# Patient Record
Sex: MALE | Race: WHITE | NOT HISPANIC OR LATINO | Employment: FULL TIME | URBAN - METROPOLITAN AREA
[De-identification: names, ages, dates, MRNs, and addresses within clinical notes are randomized per-mention and may not be internally consistent; named-entity substitution may affect disease eponyms.]

---

## 2018-08-16 ENCOUNTER — APPOINTMENT (OUTPATIENT)
Dept: URGENT CARE | Age: 23
End: 2018-08-16
Payer: OTHER MISCELLANEOUS

## 2018-08-16 PROCEDURE — 90715 TDAP VACCINE 7 YRS/> IM: CPT

## 2018-08-16 PROCEDURE — 90471 IMMUNIZATION ADMIN: CPT | Performed by: PREVENTIVE MEDICINE

## 2018-08-16 PROCEDURE — G0382 LEV 3 HOSP TYPE B ED VISIT: HCPCS

## 2018-08-16 PROCEDURE — 99283 EMERGENCY DEPT VISIT LOW MDM: CPT

## 2022-03-16 ENCOUNTER — HOSPITAL ENCOUNTER (OUTPATIENT)
Facility: HOSPITAL | Age: 27
Setting detail: OBSERVATION
Discharge: HOME | End: 2022-03-17
Attending: EMERGENCY MEDICINE | Admitting: SURGERY
Payer: COMMERCIAL

## 2022-03-16 ENCOUNTER — APPOINTMENT (EMERGENCY)
Dept: RADIOLOGY | Facility: HOSPITAL | Age: 27
End: 2022-03-16
Payer: COMMERCIAL

## 2022-03-16 ENCOUNTER — TELEPHONE (OUTPATIENT)
Dept: CT IMAGING | Facility: HOSPITAL | Age: 27
End: 2022-03-16

## 2022-03-16 ENCOUNTER — APPOINTMENT (EMERGENCY)
Dept: CT IMAGING | Facility: HOSPITAL | Age: 27
End: 2022-03-16
Payer: COMMERCIAL

## 2022-03-16 DIAGNOSIS — S09.90XA INJURY OF HEAD, INITIAL ENCOUNTER: ICD-10-CM

## 2022-03-16 DIAGNOSIS — J98.2 PNEUMOMEDIASTINUM (HCC): Primary | ICD-10-CM
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
## 2022-03-16 DIAGNOSIS — S06.0X0A CONCUSSION WITHOUT LOSS OF CONSCIOUSNESS, INITIAL ENCOUNTER: ICD-10-CM

## 2022-03-16 LAB
ALBUMIN SERPL BCP-MCNC: 4.2 G/DL (ref 3.5–5)
ALP SERPL-CCNC: 72 U/L (ref 46–116)
ALT SERPL W P-5'-P-CCNC: 33 U/L (ref 12–78)
ANION GAP SERPL CALCULATED.3IONS-SCNC: 9 MMOL/L (ref 4–13)
APTT PPP: 28 SECONDS (ref 23–37)
AST SERPL W P-5'-P-CCNC: 37 U/L (ref 5–45)
BASOPHILS # BLD AUTO: 0.04 THOUSANDS/ΜL (ref 0–0.1)
BASOPHILS NFR BLD AUTO: 0 % (ref 0–1)
BILIRUB SERPL-MCNC: 0.42 MG/DL (ref 0.2–1)
BUN SERPL-MCNC: 23 MG/DL (ref 5–25)
CALCIUM SERPL-MCNC: 9.5 MG/DL (ref 8.3–10.1)
CHLORIDE SERPL-SCNC: 103 MMOL/L (ref 100–108)
CO2 SERPL-SCNC: 27 MMOL/L (ref 21–32)
CREAT SERPL-MCNC: 1 MG/DL (ref 0.6–1.3)
EOSINOPHIL # BLD AUTO: 0.1 THOUSAND/ΜL (ref 0–0.61)
EOSINOPHIL NFR BLD AUTO: 1 % (ref 0–6)
ERYTHROCYTE [DISTWIDTH] IN BLOOD BY AUTOMATED COUNT: 13.4 % (ref 11.6–15.1)
GFR SERPL CREATININE-BSD FRML MDRD: 101 ML/MIN/1.73SQ M
GLUCOSE SERPL-MCNC: 90 MG/DL (ref 65–140)
HCT VFR BLD AUTO: 47.1 % (ref 36.5–49.3)
HGB BLD-MCNC: 15.3 G/DL (ref 12–17)
IMM GRANULOCYTES # BLD AUTO: 0.04 THOUSAND/UL (ref 0–0.2)
IMM GRANULOCYTES NFR BLD AUTO: 0 % (ref 0–2)
INR PPP: 0.93 (ref 0.84–1.19)
LYMPHOCYTES # BLD AUTO: 3.07 THOUSANDS/ΜL (ref 0.6–4.47)
LYMPHOCYTES NFR BLD AUTO: 27 % (ref 14–44)
MCH RBC QN AUTO: 30.1 PG (ref 26.8–34.3)
MCHC RBC AUTO-ENTMCNC: 32.5 G/DL (ref 31.4–37.4)
MCV RBC AUTO: 93 FL (ref 82–98)
MONOCYTES # BLD AUTO: 0.89 THOUSAND/ΜL (ref 0.17–1.22)
MONOCYTES NFR BLD AUTO: 8 % (ref 4–12)
NEUTROPHILS # BLD AUTO: 7.05 THOUSANDS/ΜL (ref 1.85–7.62)
NEUTS SEG NFR BLD AUTO: 64 % (ref 43–75)
NRBC BLD AUTO-RTO: 0 /100 WBCS
PLATELET # BLD AUTO: 190 THOUSANDS/UL (ref 149–390)
PMV BLD AUTO: 12 FL (ref 8.9–12.7)
POTASSIUM SERPL-SCNC: 4.2 MMOL/L (ref 3.5–5.3)
PROT SERPL-MCNC: 7.9 G/DL (ref 6.4–8.2)
PROTHROMBIN TIME: 12.5 SECONDS (ref 11.6–14.5)
RBC # BLD AUTO: 5.09 MILLION/UL (ref 3.88–5.62)
SODIUM SERPL-SCNC: 139 MMOL/L (ref 136–145)
WBC # BLD AUTO: 11.19 THOUSAND/UL (ref 4.31–10.16)

## 2022-03-16 PROCEDURE — 84484 ASSAY OF TROPONIN QUANT: CPT | Performed by: PHYSICIAN ASSISTANT

## 2022-03-16 PROCEDURE — 80053 COMPREHEN METABOLIC PANEL: CPT | Performed by: PHYSICIAN ASSISTANT

## 2022-03-16 PROCEDURE — 82553 CREATINE MB FRACTION: CPT | Performed by: PHYSICIAN ASSISTANT

## 2022-03-16 PROCEDURE — 85730 THROMBOPLASTIN TIME PARTIAL: CPT | Performed by: PHYSICIAN ASSISTANT

## 2022-03-16 PROCEDURE — 73030 X-RAY EXAM OF SHOULDER: CPT

## 2022-03-16 PROCEDURE — 85610 PROTHROMBIN TIME: CPT | Performed by: PHYSICIAN ASSISTANT

## 2022-03-16 PROCEDURE — 71045 X-RAY EXAM CHEST 1 VIEW: CPT

## 2022-03-16 PROCEDURE — 99285 EMERGENCY DEPT VISIT HI MDM: CPT

## 2022-03-16 PROCEDURE — 96361 HYDRATE IV INFUSION ADD-ON: CPT

## 2022-03-16 PROCEDURE — 99285 EMERGENCY DEPT VISIT HI MDM: CPT | Performed by: PHYSICIAN ASSISTANT

## 2022-03-16 PROCEDURE — 85025 COMPLETE CBC W/AUTO DIFF WBC: CPT | Performed by: PHYSICIAN ASSISTANT

## 2022-03-16 PROCEDURE — 82550 ASSAY OF CK (CPK): CPT | Performed by: PHYSICIAN ASSISTANT

## 2022-03-16 PROCEDURE — 36415 COLL VENOUS BLD VENIPUNCTURE: CPT | Performed by: PHYSICIAN ASSISTANT

## 2022-03-16 RX ORDER — MORPHINE SULFATE 4 MG/ML
4 INJECTION, SOLUTION INTRAMUSCULAR; INTRAVENOUS ONCE
Status: COMPLETED | OUTPATIENT
Start: 2022-03-16 | End: 2022-03-17

## 2022-03-16 RX ADMIN — SODIUM CHLORIDE 1000 ML: 0.9 INJECTION, SOLUTION INTRAVENOUS at 23:39

## 2022-03-16 NOTE — LETTER
[facility] [address] [id number]  Dept: [phone]    March 17, 2022     Patient: Henry Snyder   YOB: 1995   Date of Visit: 3/16/2022       To Whom it May Concern:    Сергей Beaulieu is under my professional care  He was seen in the hospital from 3/16/2022   to 03/17/22  He may return to work with the following limitations until re-evaluated on March 29, 2022: no heavy lifting over 20 lbs, no pushing or pulling over 50 lbs, no jumping, running, or contact sports  If you have any questions or concerns, please don't hesitate to call           Sincerely,          Olu Jiménez PA-C

## 2022-03-17 ENCOUNTER — APPOINTMENT (OUTPATIENT)
Dept: RADIOLOGY | Facility: HOSPITAL | Age: 27
End: 2022-03-17
Payer: COMMERCIAL

## 2022-03-17 ENCOUNTER — APPOINTMENT (EMERGENCY)
Dept: CT IMAGING | Facility: HOSPITAL | Age: 27
End: 2022-03-17
Payer: COMMERCIAL

## 2022-03-17 VITALS
SYSTOLIC BLOOD PRESSURE: 107 MMHG | TEMPERATURE: 98.8 F | HEART RATE: 50 BPM | RESPIRATION RATE: 16 BRPM | OXYGEN SATURATION: 99 % | DIASTOLIC BLOOD PRESSURE: 58 MMHG

## 2022-03-17 PROBLEM — S06.0XAA CONCUSSION: Status: ACTIVE | Noted: 2022-03-17

## 2022-03-17 PROBLEM — J98.2 PNEUMOMEDIASTINUM (HCC): Status: ACTIVE | Noted: 2022-03-17
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
## 2022-03-17 PROBLEM — S06.0X9A CONCUSSION: Status: ACTIVE | Noted: 2022-03-17

## 2022-03-17 LAB
ABO GROUP BLD: NORMAL
ABO GROUP BLD: NORMAL
ALBUMIN SERPL BCP-MCNC: 3.4 G/DL (ref 3.5–5)
ALP SERPL-CCNC: 59 U/L (ref 46–116)
ALT SERPL W P-5'-P-CCNC: 26 U/L (ref 12–78)
ANION GAP SERPL CALCULATED.3IONS-SCNC: 9 MMOL/L (ref 4–13)
AST SERPL W P-5'-P-CCNC: 28 U/L (ref 5–45)
BASOPHILS # BLD AUTO: 0.02 THOUSANDS/ΜL (ref 0–0.1)
BASOPHILS NFR BLD AUTO: 0 % (ref 0–1)
BILIRUB SERPL-MCNC: 0.6 MG/DL (ref 0.2–1)
BLD GP AB SCN SERPL QL: NEGATIVE
BUN SERPL-MCNC: 18 MG/DL (ref 5–25)
CALCIUM ALBUM COR SERPL-MCNC: 9.5 MG/DL (ref 8.3–10.1)
CALCIUM SERPL-MCNC: 9 MG/DL (ref 8.3–10.1)
CARDIAC TROPONIN I PNL SERPL HS: <2 NG/L
CHLORIDE SERPL-SCNC: 106 MMOL/L (ref 100–108)
CK MB SERPL-MCNC: 1.6 NG/ML (ref 0–5)
CK MB SERPL-MCNC: <1 % (ref 0–2.5)
CK SERPL-CCNC: 375 U/L (ref 39–308)
CO2 SERPL-SCNC: 23 MMOL/L (ref 21–32)
CREAT SERPL-MCNC: 0.9 MG/DL (ref 0.6–1.3)
EOSINOPHIL # BLD AUTO: 0.05 THOUSAND/ΜL (ref 0–0.61)
EOSINOPHIL NFR BLD AUTO: 1 % (ref 0–6)
ERYTHROCYTE [DISTWIDTH] IN BLOOD BY AUTOMATED COUNT: 13.5 % (ref 11.6–15.1)
GFR SERPL CREATININE-BSD FRML MDRD: 115 ML/MIN/1.73SQ M
GLUCOSE SERPL-MCNC: 98 MG/DL (ref 65–140)
HCT VFR BLD AUTO: 39.5 % (ref 36.5–49.3)
HGB BLD-MCNC: 13.5 G/DL (ref 12–17)
IMM GRANULOCYTES # BLD AUTO: 0.03 THOUSAND/UL (ref 0–0.2)
IMM GRANULOCYTES NFR BLD AUTO: 0 % (ref 0–2)
LYMPHOCYTES # BLD AUTO: 2.51 THOUSANDS/ΜL (ref 0.6–4.47)
LYMPHOCYTES NFR BLD AUTO: 27 % (ref 14–44)
MCH RBC QN AUTO: 30.8 PG (ref 26.8–34.3)
MCHC RBC AUTO-ENTMCNC: 34.2 G/DL (ref 31.4–37.4)
MCV RBC AUTO: 90 FL (ref 82–98)
MONOCYTES # BLD AUTO: 0.94 THOUSAND/ΜL (ref 0.17–1.22)
MONOCYTES NFR BLD AUTO: 10 % (ref 4–12)
NEUTROPHILS # BLD AUTO: 5.84 THOUSANDS/ΜL (ref 1.85–7.62)
NEUTS SEG NFR BLD AUTO: 62 % (ref 43–75)
NRBC BLD AUTO-RTO: 0 /100 WBCS
PLATELET # BLD AUTO: 155 THOUSANDS/UL (ref 149–390)
PMV BLD AUTO: 11.9 FL (ref 8.9–12.7)
POTASSIUM SERPL-SCNC: 4 MMOL/L (ref 3.5–5.3)
PROT SERPL-MCNC: 6.4 G/DL (ref 6.4–8.2)
RBC # BLD AUTO: 4.39 MILLION/UL (ref 3.88–5.62)
RH BLD: POSITIVE
RH BLD: POSITIVE
SODIUM SERPL-SCNC: 138 MMOL/L (ref 136–145)
SPECIMEN EXPIRATION DATE: NORMAL
WBC # BLD AUTO: 9.39 THOUSAND/UL (ref 4.31–10.16)

## 2022-03-17 PROCEDURE — NC001 PR NO CHARGE: Performed by: SURGERY

## 2022-03-17 PROCEDURE — 96361 HYDRATE IV INFUSION ADD-ON: CPT

## 2022-03-17 PROCEDURE — 72125 CT NECK SPINE W/O DYE: CPT

## 2022-03-17 PROCEDURE — 84484 ASSAY OF TROPONIN QUANT: CPT | Performed by: PHYSICIAN ASSISTANT

## 2022-03-17 PROCEDURE — 36415 COLL VENOUS BLD VENIPUNCTURE: CPT | Performed by: PHYSICIAN ASSISTANT

## 2022-03-17 PROCEDURE — 70450 CT HEAD/BRAIN W/O DYE: CPT

## 2022-03-17 PROCEDURE — 74177 CT ABD & PELVIS W/CONTRAST: CPT

## 2022-03-17 PROCEDURE — 86900 BLOOD TYPING SEROLOGIC ABO: CPT | Performed by: PHYSICIAN ASSISTANT

## 2022-03-17 PROCEDURE — 80053 COMPREHEN METABOLIC PANEL: CPT | Performed by: PHYSICIAN ASSISTANT

## 2022-03-17 PROCEDURE — 99217 PR OBSERVATION CARE DISCHARGE MANAGEMENT: CPT | Performed by: SURGERY

## 2022-03-17 PROCEDURE — 74220 X-RAY XM ESOPHAGUS 1CNTRST: CPT

## 2022-03-17 PROCEDURE — 85025 COMPLETE CBC W/AUTO DIFF WBC: CPT | Performed by: PHYSICIAN ASSISTANT

## 2022-03-17 PROCEDURE — 93005 ELECTROCARDIOGRAM TRACING: CPT

## 2022-03-17 PROCEDURE — 86850 RBC ANTIBODY SCREEN: CPT | Performed by: PHYSICIAN ASSISTANT

## 2022-03-17 PROCEDURE — 99235 HOSP IP/OBS SAME DATE MOD 70: CPT | Performed by: SURGERY

## 2022-03-17 PROCEDURE — 96374 THER/PROPH/DIAG INJ IV PUSH: CPT

## 2022-03-17 PROCEDURE — 71260 CT THORAX DX C+: CPT

## 2022-03-17 PROCEDURE — 86901 BLOOD TYPING SEROLOGIC RH(D): CPT | Performed by: PHYSICIAN ASSISTANT

## 2022-03-17 PROCEDURE — G1004 CDSM NDSC: HCPCS

## 2022-03-17 RX ORDER — ACETAMINOPHEN 325 MG/1
650 TABLET ORAL EVERY 8 HOURS SCHEDULED
Refills: 0
Start: 2022-03-17

## 2022-03-17 RX ORDER — IBUPROFEN 400 MG/1
400 TABLET ORAL EVERY 6 HOURS PRN
Refills: 0
Start: 2022-03-17

## 2022-03-17 RX ORDER — SODIUM CHLORIDE, SODIUM GLUCONATE, SODIUM ACETATE, POTASSIUM CHLORIDE, MAGNESIUM CHLORIDE, SODIUM PHOSPHATE, DIBASIC, AND POTASSIUM PHOSPHATE .53; .5; .37; .037; .03; .012; .00082 G/100ML; G/100ML; G/100ML; G/100ML; G/100ML; G/100ML; G/100ML
125 INJECTION, SOLUTION INTRAVENOUS CONTINUOUS
Status: DISCONTINUED | OUTPATIENT
Start: 2022-03-17 | End: 2022-03-17

## 2022-03-17 RX ORDER — KETOROLAC TROMETHAMINE 30 MG/ML
15 INJECTION, SOLUTION INTRAMUSCULAR; INTRAVENOUS EVERY 6 HOURS SCHEDULED
Status: DISCONTINUED | OUTPATIENT
Start: 2022-03-17 | End: 2022-03-17

## 2022-03-17 RX ORDER — ACETAMINOPHEN 325 MG/1
975 TABLET ORAL EVERY 8 HOURS SCHEDULED
Status: DISCONTINUED | OUTPATIENT
Start: 2022-03-17 | End: 2022-03-17 | Stop reason: HOSPADM

## 2022-03-17 RX ORDER — ONDANSETRON 2 MG/ML
4 INJECTION INTRAMUSCULAR; INTRAVENOUS EVERY 4 HOURS PRN
Status: DISCONTINUED | OUTPATIENT
Start: 2022-03-17 | End: 2022-03-17 | Stop reason: HOSPADM

## 2022-03-17 RX ORDER — OXYCODONE HYDROCHLORIDE 10 MG/1
10 TABLET ORAL EVERY 4 HOURS PRN
Status: DISCONTINUED | OUTPATIENT
Start: 2022-03-17 | End: 2022-03-17 | Stop reason: HOSPADM

## 2022-03-17 RX ORDER — OXYCODONE HYDROCHLORIDE 5 MG/1
5 TABLET ORAL EVERY 4 HOURS PRN
Status: DISCONTINUED | OUTPATIENT
Start: 2022-03-17 | End: 2022-03-17 | Stop reason: HOSPADM

## 2022-03-17 RX ORDER — DOCUSATE SODIUM 100 MG/1
100 CAPSULE, LIQUID FILLED ORAL 2 TIMES DAILY
Status: DISCONTINUED | OUTPATIENT
Start: 2022-03-17 | End: 2022-03-17 | Stop reason: HOSPADM

## 2022-03-17 RX ORDER — METHOCARBAMOL 500 MG/1
500 TABLET, FILM COATED ORAL EVERY 6 HOURS SCHEDULED
Status: DISCONTINUED | OUTPATIENT
Start: 2022-03-17 | End: 2022-03-17 | Stop reason: HOSPADM

## 2022-03-17 RX ORDER — HYDROMORPHONE HCL/PF 1 MG/ML
0.5 SYRINGE (ML) INJECTION
Status: DISCONTINUED | OUTPATIENT
Start: 2022-03-17 | End: 2022-03-17

## 2022-03-17 RX ADMIN — KETOROLAC TROMETHAMINE 15 MG: 30 INJECTION, SOLUTION INTRAMUSCULAR at 03:23

## 2022-03-17 RX ADMIN — IOHEXOL 100 ML: 350 INJECTION, SOLUTION INTRAVENOUS at 00:10

## 2022-03-17 RX ADMIN — KETOROLAC TROMETHAMINE 15 MG: 30 INJECTION, SOLUTION INTRAMUSCULAR at 08:22

## 2022-03-17 RX ADMIN — METHOCARBAMOL 500 MG: 500 TABLET ORAL at 08:23

## 2022-03-17 RX ADMIN — ENOXAPARIN SODIUM 30 MG: 30 INJECTION SUBCUTANEOUS at 03:23

## 2022-03-17 RX ADMIN — DOCUSATE SODIUM 100 MG: 100 CAPSULE ORAL at 08:23

## 2022-03-17 RX ADMIN — IOHEXOL 200 ML: 350 INJECTION, SOLUTION INTRAVENOUS at 10:45

## 2022-03-17 RX ADMIN — OXYCODONE HYDROCHLORIDE 5 MG: 5 TABLET ORAL at 03:17

## 2022-03-17 RX ADMIN — MORPHINE SULFATE 4 MG: 4 INJECTION INTRAVENOUS at 00:52

## 2022-03-17 RX ADMIN — METHOCARBAMOL 500 MG: 500 TABLET ORAL at 03:24

## 2022-03-17 RX ADMIN — SODIUM CHLORIDE, SODIUM GLUCONATE, SODIUM ACETATE, POTASSIUM CHLORIDE, MAGNESIUM CHLORIDE, SODIUM PHOSPHATE, DIBASIC, AND POTASSIUM PHOSPHATE 125 ML/HR: .53; .5; .37; .037; .03; .012; .00082 INJECTION, SOLUTION INTRAVENOUS at 03:15

## 2022-03-17 NOTE — ASSESSMENT & PLAN NOTE
- Traumatic pneumomediastum secondary to fall onto chest  - Associated odynophagia and chest pain when talking  - Admit to med/surg overnight for observation   - Monitor for tachycardia/fevers  - Barium swallow eval pending  - Multimodal pain regimen   - IV fluids while NPO

## 2022-03-17 NOTE — ASSESSMENT & PLAN NOTE
- Fall while skiing landing on chest with subsequent chest pain  - Below noted injuries  - Multimodal pain regimen

## 2022-03-17 NOTE — ASSESSMENT & PLAN NOTE
- patient was snowboarding when he went off a jump and landed on his head, he was wearing a helmet, no loss of consciousness  - CT head negative  - CT C-spine negative  - patient continues to have a headache, no dizziness or photophobia    - Tylenol  - follow-up in trauma clinic in 2 weeks if symptoms continue

## 2022-03-17 NOTE — ED PROVIDER NOTES
History  Chief Complaint   Patient presents with    Rib Pain     Pt was snowboarding around 6 pm, went off of a jump and hit his head, +helmet, +LOC, no thinners  Reports R rib pain and pain with breathing     Patient is a 70-year-old male presenting to the emergency room for evaluation of right rib pain and right upper quadrant pain  Patient states around 6:30 p m  This evening he was snowboarding, he states he went off a job that was about 8-10 feet high  He states he did not land his jump and he fell onto his left side  Patient states he did strike his head and lose consciousness for an unknown period of time  He did have a helmet on at this time  He states he feels fine other than having some right rib and right upper quadrant pain  Patient was able to ambulate on the scene  He complains of no other injuries  History provided by:  Patient   used: No        None       History reviewed  No pertinent past medical history  History reviewed  No pertinent surgical history  History reviewed  No pertinent family history  I have reviewed and agree with the history as documented  E-Cigarette/Vaping     E-Cigarette/Vaping Substances     Social History     Tobacco Use    Smoking status: Never Smoker    Smokeless tobacco: Never Used   Substance Use Topics    Alcohol use: Never    Drug use: Never       Review of Systems   Constitutional: Negative for chills and fever  HENT: Negative for ear pain and sore throat  Head injury   LOC   Eyes: Negative for pain and visual disturbance  Respiratory: Negative for cough and shortness of breath  Cardiovascular: Negative for chest pain and palpitations  Gastrointestinal: Negative for abdominal pain and vomiting  Genitourinary: Negative for dysuria and hematuria  Musculoskeletal: Positive for arthralgias (right rib pain)  Negative for back pain  Skin: Negative for color change and rash     Neurological: Negative for seizures and syncope  All other systems reviewed and are negative  Physical Exam  Physical Exam  Vitals and nursing note reviewed  Constitutional:       Appearance: He is well-developed  HENT:      Head: Normocephalic and atraumatic  Comments: No signs basilar skull fracture  Eyes:      Conjunctiva/sclera: Conjunctivae normal    Neck:      Comments: No cervical spine tenderness  Cardiovascular:      Rate and Rhythm: Normal rate and regular rhythm  Heart sounds: No murmur heard  Pulmonary:      Effort: Pulmonary effort is normal  No respiratory distress  Breath sounds: Normal breath sounds  Abdominal:      Palpations: Abdomen is soft  Tenderness: There is abdominal tenderness  Comments: RUQ tenderness  No abdominal / flank ecchymosis   Musculoskeletal:         General: Tenderness present  Cervical back: Normal range of motion and neck supple  No tenderness  Comments: Tenderness to left shoulder  Tenderness to right anterior ribs  No thoracic spine or lumbar spine tenderness   Skin:     General: Skin is warm and dry  Neurological:      Mental Status: He is alert           Vital Signs  ED Triage Vitals   Temperature Pulse Respirations Blood Pressure SpO2   03/16/22 2022 03/16/22 2022 03/16/22 2022 03/16/22 2022 03/16/22 2022   99 2 °F (37 3 °C) 86 18 132/65 100 %      Temp Source Heart Rate Source Patient Position - Orthostatic VS BP Location FiO2 (%)   03/16/22 2022 03/16/22 2022 03/16/22 2022 03/16/22 2022 --   Oral Monitor Sitting Left arm       Pain Score       03/17/22 0051       6           Vitals:    03/16/22 2022 03/17/22 0051 03/17/22 0225   BP: 132/65 158/70 116/61   Pulse: 86 82 78   Patient Position - Orthostatic VS: Sitting Lying Lying         ED Medications  Medications   multi-electrolyte (PLASMALYTE-A/ISOLYTE-S PH 7 4) IV solution (has no administration in time range)   methocarbamol (ROBAXIN) tablet 500 mg (has no administration in time range) docusate sodium (COLACE) capsule 100 mg (has no administration in time range)   ondansetron (ZOFRAN) injection 4 mg (has no administration in time range)   enoxaparin (LOVENOX) subcutaneous injection 30 mg (has no administration in time range)   acetaminophen (TYLENOL) tablet 975 mg (has no administration in time range)   ketorolac (TORADOL) injection 15 mg (has no administration in time range)   oxyCODONE (ROXICODONE) IR tablet 5 mg (has no administration in time range)   HYDROmorphone (DILAUDID) injection 0 5 mg (has no administration in time range)   oxyCODONE (ROXICODONE) immediate release tablet 10 mg (has no administration in time range)   morphine (PF) 4 mg/mL injection 4 mg (4 mg Intravenous Given 3/17/22 0052)   sodium chloride 0 9 % bolus 1,000 mL (0 mL Intravenous Stopped 3/17/22 0143)   iohexol (OMNIPAQUE) 350 MG/ML injection (SINGLE-DOSE) 100 mL (100 mL Intravenous Given 3/17/22 0010)       Diagnostic Studies  Results Reviewed     Procedure Component Value Units Date/Time    HS Troponin I 2hr [[id number]] Collected: 03/17/22 0148    Lab Status: Final result Specimen: Blood from Arm, Right Updated: 03/17/22 0233     hs TnI 2hr <2 ng/L      Delta 2hr hsTnI --    Platelet count [[id number]]     Lab Status: No result Specimen: Blood     CKMB [[id number]]  (Normal) Collected: 03/16/22 2334    Lab Status: Final result Specimen: Blood from Arm, Right Updated: 03/17/22 0029     CK-MB Index <1 0 %      CK-MB 1 6 ng/mL     CK [[id number]]  (Abnormal) Collected: 03/16/22 2334    Lab Status: Final result Specimen: Blood from Arm, Right Updated: 03/17/22 0021     Total  U/L     HS Troponin I 4hr [[id number]]     Lab Status: No result Specimen: Blood     HS Troponin 0hr (reflex protocol) [[id number]]  (Normal) Collected: 03/16/22 2334    Lab Status: Final result Specimen: Blood from Arm, Right Updated: 03/17/22 0010     hs TnI 0hr <2 ng/L     Comprehensive metabolic panel [[id number]] Collected: 03/16/22 8429    Lab Status: Final result Specimen: Blood from Arm, Right Updated: 03/16/22 2354     Sodium 139 mmol/L      Potassium 4 2 mmol/L      Chloride 103 mmol/L      CO2 27 mmol/L      ANION GAP 9 mmol/L      BUN 23 mg/dL      Creatinine 1 00 mg/dL      Glucose 90 mg/dL      Calcium 9 5 mg/dL      AST 37 U/L      ALT 33 U/L      Alkaline Phosphatase 72 U/L      Total Protein 7 9 g/dL      Albumin 4 2 g/dL      Total Bilirubin 0 42 mg/dL      eGFR 101 ml/min/1 73sq m     Narrative:      National Kidney Disease Foundation guidelines for Chronic Kidney Disease (CKD):     Stage 1 with normal or high GFR (GFR > 90 mL/min/1 73 square meters)    Stage 2 Mild CKD (GFR = 60-89 mL/min/1 73 square meters)    Stage 3A Moderate CKD (GFR = 45-59 mL/min/1 73 square meters)    Stage 3B Moderate CKD (GFR = 30-44 mL/min/1 73 square meters)    Stage 4 Severe CKD (GFR = 15-29 mL/min/1 73 square meters)    Stage 5 End Stage CKD (GFR <15 mL/min/1 73 square meters)  Note: GFR calculation is accurate only with a steady state creatinine    Protime-INR [[id number]]  (Normal) Collected: 03/16/22 2308    Lab Status: Final result Specimen: Blood from Arm, Right Updated: 03/16/22 2344     Protime 12 5 seconds      INR 0 93    APTT [[id number]]  (Normal) Collected: 03/16/22 2308    Lab Status: Final result Specimen: Blood from Arm, Right Updated: 03/16/22 2344     PTT 28 seconds     CBC and differential [[id number]]  (Abnormal) Collected: 03/16/22 2308    Lab Status: Final result Specimen: Blood from Arm, Right Updated: 03/16/22 2331     WBC 11 19 Thousand/uL      RBC 5 09 Million/uL      Hemoglobin 15 3 g/dL      Hematocrit 47 1 %      MCV 93 fL      MCH 30 1 pg      MCHC 32 5 g/dL      RDW 13 4 %      MPV 12 0 fL      Platelets 920 Thousands/uL      nRBC 0 /100 WBCs      Neutrophils Relative 64 %      Immat GRANS % 0 %      Lymphocytes Relative 27 %      Monocytes Relative 8 %      Eosinophils Relative 1 %      Basophils Relative 0 %      Neutrophils Absolute 7 05 Thousands/µL      Immature Grans Absolute 0 04 Thousand/uL      Lymphocytes Absolute 3 07 Thousands/µL      Monocytes Absolute 0 89 Thousand/µL      Eosinophils Absolute 0 10 Thousand/µL      Basophils Absolute 0 04 Thousands/µL                  XR shoulder 2+ views LEFT   ED Interpretation by Peter Diamond PA-C (03/17 0243)   No acute findings       CT head without contrast   Final Result by Emilia Valdes MD (03/17 0104)      No intracranial hemorrhage or calvarial fracture  Workstation performed: [id number]         CT spine cervical without contrast   Final Result by Emilia Valdes MD (03/17 0104)      1  No cervical spine fracture or traumatic malalignment  2   Pneumomediastinum  Workstation performed: [id number]         CT chest abdomen pelvis w contrast   Final Result by Emilia Valdes MD (03/17 4063)      Pneumomediastinum  No evidence of mediastinal fluid collection or esophageal thickening  No pneumothorax or pleural effusion        I personally discussed this study with Inocencio Garcia on 3/17/2022 at 12:53 AM       Workstation performed: [id number]         XR chest 1 view portable   ED Interpretation by Peter Diamond PA-C (03/17 0243)   No acute findings       FL esophagram complete    (Results Pending)              Procedures  ECG 12 Lead Documentation Only    Date/Time: 3/17/2022 12:28 AM  Performed by: Peter Diamond PA-C  Authorized by: Peter Diamond PA-C     Indications / Diagnosis:  Trauma  ECG reviewed by me, the ED Provider: yes    Patient location:  ED  Rate:     ECG rate:  87    ECG rate assessment: normal    Rhythm:     Rhythm: sinus rhythm    Ectopy:     Ectopy: none    QRS:     QRS axis:  Normal  Conduction:     Conduction: normal    ST segments:     ST segments:  Normal  T waves:     T waves: normal               ED Course  ED Course as of 03/17/22 0244   Thu Mar 17, 2022   Elieser Wall Trauma attending notified of abnormal CT results              MDM  Number of Diagnoses or Management Options  Injury of head, initial encounter: new and requires workup  Pneumomediastinum [facility]): new and requires workup  Snowboard accident, initial encounter: new and requires workup     Amount and/or Complexity of Data Reviewed  Clinical lab tests: ordered and reviewed  Tests in the radiology section of CPT®: ordered and reviewed    Risk of Complications, Morbidity, and/or Mortality  Presenting problems: high  Diagnostic procedures: high  Management options: high    Patient Progress  Patient progress: stable      Disposition  Final diagnoses:   Pneumomediastinum ([facility] [address] )   Snowboard accident, initial encounter   Injury of head, initial encounter     Time reflects when diagnosis was documented in both MDM as applicable and the Disposition within this note     Time User Action Codes Description Comment    3/17/2022  1:48 AM Beckielizatiffani Yoder Add [J98 2] Pneumomediastinum ([facility] [address] )     3/17/2022  1:49 AM Maryelizabeth Dies Add [address] accident, initial encounter     3/17/2022  1:49 AM Beckielizatiffani Yoder Add [S09 90XA] Injury of head, initial encounter       ED Disposition     None      Follow-up Information    None         Patient's Medications    No medications on file       No discharge procedures on file      PDMP Review       Value Time User    PDMP Reviewed  Yes 3/16/2022 10:43 PM Alonso Kaba MD          ED Provider  Electronically Signed by           Peter Mata PA-C  03/17/22 5941

## 2022-03-17 NOTE — DISCHARGE INSTRUCTIONS
Pneumomediastinum is abnormal air around your heart after trauma  · Troponin levels (heart enzymes) are normal  · No pneumothorax/ collapsed lung  · Your esophagus has been evaluated an there is no perforation  · If you experience chest pain or shortness of breath, call 911 or emergently go to an emergency department  · Follow up with trauma clinic on 3/29/2022      Concussion   WHAT YOU NEED TO KNOW:   A concussion is a mild brain injury  It is usually caused by a bump or blow to the head from a fall, a motor vehicle crash, or a sports injury  Sometimes being shaken forcefully may cause a concussion  DISCHARGE INSTRUCTIONS:   Have someone call 911 for any of the following:   · You cannot be woken  · You have a seizure, increasing confusion, or a change in personality  · Your speech becomes slurred, or you have new vision problems  Seek care immediately if:   · You have sudden and new vision problems  · You have a severe headache that does not go away  · You have arm or leg weakness, numbness, or new problems with coordination  · You have blood or clear fluid coming out of the ears or nose  Contact your healthcare provider if:   · You have nausea or are vomiting  · You feel more sleepy than usual     · Your symptoms get worse  · Your symptoms last longer than 6 weeks after the injury  · You have questions or concerns about your condition or care  Medicines: You may need any of the following:  · Acetaminophen  decreases pain and fever  It is available without a doctor's order  Ask how much to take and how often to take it  Follow directions  Read the labels of all other medicines you are using to see if they also contain acetaminophen, or ask your doctor or pharmacist  Acetaminophen can cause liver damage if not taken correctly  Do not use more than 4 grams (4,000 milligrams) total of acetaminophen in one day  · NSAIDs  help decrease swelling and pain or fever   This medicine is available with or without a doctor's order  NSAIDs can cause stomach bleeding or kidney problems in certain people  If you take blood thinner medicine, always ask your healthcare provider if NSAIDs are safe for you  Always read the medicine label and follow directions  · Take your medicine as directed  Contact your healthcare provider if you think your medicine is not helping or if you have side effects  Tell him or her if you are allergic to any medicine  Keep a list of the medicines, vitamins, and herbs you take  Include the amounts, and when and why you take them  Bring the list or the pill bottles to follow-up visits  Carry your medicine list with you in case of an emergency  Self-care:  Concussion symptoms usually go away within about 10 days, but they may last longer  The following may be recommended to manage your symptoms:  · Rest from physical and mental activities as directed  Mental activities are those that require thinking, concentration, and attention  You will need to rest until your symptoms are gone  Rest will allow you to recover from your concussion  Ask your healthcare provider when you can return to work and other daily activities  · Have someone stay with you for the first 24 hours after your injury  Your healthcare provider should be contacted if your symptoms get worse, or you develop new symptoms  · Do not participate in sports and physical activities until your healthcare provider says it is okay  They could make your symptoms worse or lead to another concussion  Your healthcare provider will tell you when it is okay for you to return to sports or physical activities  Ask for more information about sports concussions  Prevent another concussion:   · Wear protective sports equipment that fits properly  Helmets help decrease your risk for a serious brain injury   Talk to your healthcare provider about ways that can decrease your risk for a concussion if you play sports  · Wear your seatbelt every time you travel  This helps to decrease your risk for a head injury if you are in a car accident  Follow up with your doctor as directed:  Write down your questions so you remember to ask them during your visits  © Copyright [employer] 2022 Information is for End User's use only and may not be sold, redistributed or otherwise used for commercial purposes  All illustrations and images included in CareNotes® are the copyrighted property of [employer]  or [facility]   The above information is an  only  It is not intended as medical advice for individual conditions or treatments  Talk to your doctor, nurse or pharmacist before following any medical regimen to see if it is safe and effective for you

## 2022-03-17 NOTE — PROGRESS NOTES
[facility]  Progress Note - Roberto Carlos Serrano 1995, 27 y o  male MRN: [id number]  Unit/Bed#: S -01 Encounter: [id number]  Primary Care Provider: No primary care provider on file  Date and time admitted to hospital: 3/16/2022 10:27 PM    Concussion  Assessment & Plan  - patient was snowboarding when he went off a jump and landed on his head, he was wearing a helmet, no loss of consciousness  - CT head negative  - CT C-spine negative  - patient continues to have a headache, no dizziness or photophobia  - Tylenol  - follow-up in trauma clinic in 2 weeks if symptoms continue    Snowboarding accident, initial encounter  [address] [facility] while going off of a jump 8-10 ft while snowboarding landing upside down, mostly on left side of shoulder and head on chest with subsequent chest pain  - patient was wearing a helmet  - Below noted injuries  - Multimodal pain regimen    * Pneumomediastinum ([address] )  Assessment & Plan  - Traumatic pneumomediastum secondary to fall onto chest  - Associated odynophagia and chest pain when talking  - Admit to med/surg overnight for observation   - Monitor for tachycardia/fevers  - Barium swallow eval pending  - Multimodal pain regimen   - IV fluids while NPO      TERTIARY TRAUMA SURVEY NOTE    Prophylaxis: Sequential compression device (Venodyne)  and Enoxaparin (Lovenox)    Disposition:  Pending esophagram results    Code status:  Level 1 - Full Code    Consultants:  Radiology      SUBJECTIVE:     Transfer from:  None  Mechanism of Injury:Fall    Chief Complaint: "I have a sore throat"    HPI/Last 24 hour events:  Patient reports that he continues to have a sore throat, when he swallows his saliva feels like his throat is swollen  He denies any difficulty breathing, however he reports rib pain on the lower portion of his right ribs, reports that it does not hurt when he touches it but when he moves    He also reports that he has a headache, denies any dizziness  He further denies any other pain from his fall  He admits that he has not been out of bed yet for ambulation trial    Active medications:           Current Facility-Administered Medications:     acetaminophen (TYLENOL) tablet 975 mg, 975 mg, Oral, Q8H RORY    docusate sodium (COLACE) capsule 100 mg, 100 mg, Oral, BID, 100 mg at 03/17/22 0823    enoxaparin (LOVENOX) subcutaneous injection 30 mg, 30 mg, Subcutaneous, Q12H, 30 mg at 03/17/22 0323    HYDROmorphone (DILAUDID) injection 0 5 mg, 0 5 mg, Intravenous, Q1H PRN    ketorolac (TORADOL) injection 15 mg, 15 mg, Intravenous, Q6H RORY, 15 mg at 03/17/22 0822    methocarbamol (ROBAXIN) tablet 500 mg, 500 mg, Oral, Q6H RORY, 500 mg at 03/17/22 0823    multi-electrolyte (PLASMALYTE-A/ISOLYTE-S PH 7 4) IV solution, 125 mL/hr, Intravenous, Continuous, 125 mL/hr at 03/17/22 0315    ondansetron (ZOFRAN) injection 4 mg, 4 mg, Intravenous, Q4H PRN    oxyCODONE (ROXICODONE) immediate release tablet 10 mg, 10 mg, Oral, Q4H PRN    oxyCODONE (ROXICODONE) IR tablet 5 mg, 5 mg, Oral, Q4H PRN, 5 mg at 03/17/22 0317      OBJECTIVE:     Vitals:   Vitals:    03/17/22 0700   BP: 97/51   Pulse: 89   Resp: 16   Temp: 99 °F (37 2 °C)   SpO2: 98%       Physical Exam:   GENERAL APPEARANCE: Patient in no acute distress  HEENT: NCAT; PERRL, EOMs intact; Mucous membranes moist  NECK / BACK:  Trachea midline without deviation, nontender trachea or other musculature in the neck  CV: Regular rate and rhythm; no murmur/gallops/rubs appreciated  CHEST / LUNGS: Clear to auscultation; no wheezes/rales/rhonci  Nontender chest wall  ABD: NABS; soft; non-distended; non-tender  :  Voiding spontaneously  EXT: +2 pulses bilaterally upper & lower extremities; no edema  NEURO: GCS 15; no focal neurologic deficits; neurovascularly intact  SKIN: Warm, dry and well perfused; no rash; no jaundice                      I/O:   I/O       03/15 5138  03/16 0700 03/16 0701  03/17 0700 03/17 0701  03/18 0700    IV Piggyback  1000     Total Intake  1000     Net  +1000                  Invasive Devices: Invasive Devices  Report    Peripheral Intravenous Line            Peripheral IV 03/16/22 Right Antecubital <1 day                  Imaging:   CT head without contrast    Result Date: 3/17/2022  Impression: No intracranial hemorrhage or calvarial fracture  Workstation performed: [id number]     CT spine cervical without contrast    Result Date: 3/17/2022  Impression: 1  No cervical spine fracture or traumatic malalignment  2   Pneumomediastinum  Workstation performed: [id number]     CT chest abdomen pelvis w contrast    Result Date: 3/17/2022  Impression: Pneumomediastinum  No evidence of mediastinal fluid collection or esophageal thickening  No pneumothorax or pleural effusion   I personally discussed this study with [address] on 3/17/2022 at 12:53 AM  Workstation performed: [id number]       Labs:   CBC:   Lab Results   Component Value Date    WBC 9 39 03/17/2022    HGB 13 5 03/17/2022    HCT 39 5 03/17/2022    MCV 90 03/17/2022     03/17/2022    MCH 30 8 03/17/2022    MCHC 34 2 03/17/2022    RDW 13 5 03/17/2022    MPV 11 9 03/17/2022    NRBC 0 03/17/2022     CMP:   Lab Results   Component Value Date     03/17/2022    CO2 23 03/17/2022    BUN 18 03/17/2022    CREATININE 0 90 03/17/2022    CALCIUM 9 0 03/17/2022    AST 28 03/17/2022    ALT 26 03/17/2022    ALKPHOS 59 03/17/2022    EGFR 115 03/17/2022

## 2022-03-17 NOTE — H&P
[facility]  H&P- Beulah Grant 1995, 27 y o  male MRN: [id number]  Unit/Bed#: LUA Raman Encounter: [id number]  Primary Care Provider: No primary care provider on file  Date and time admitted to hospital: 3/16/2022 10:27 PM    Pneumomediastinum ([address] )  Assessment & Plan  - Traumatic pneumomediastum secondary to fall onto chest  - Associated odynophagia and chest pain when talking  - Admit to med/surg overnight for observation   - Monitor for tachycardia/fevers  - Barium swallow eval in am with odynophagia  - Multimodal pain regimen     Skiing accident  Assessment & Plan  - Fall while skiing landing on chest with subsequent chest pain  - Below noted injuries  - Multimodal pain regimen      Trauma Alert: Evaluation; trauma team notified at [address] via text   Model of Arrival: Self    Trauma Team: Attending Jose David Hardin and GUS Johnson  Consultants:     None     History of Present Illness     Chief Complaint: chest pain  Mechanism:Fall     HPI:    Beulah Grant is a 27 y o  male with no significant pmh who presents with chest pain after falling while skiing  He reports he landed on his chest and had immediate onset of bilateral chest wall pain  He denies striking his head or loc  He denies extremity pain, abdominal pain, n/v, dizziness  Review of Systems   Constitutional: Negative for activity change, appetite change, diaphoresis, fatigue and fever  HENT: Negative for congestion, ear discharge, ear pain, facial swelling, hearing loss, mouth sores, nosebleeds, postnasal drip, rhinorrhea, sinus pressure, sinus pain, sneezing and sore throat  Eyes: Negative for photophobia, pain and redness  Respiratory: Negative for cough, chest tightness, shortness of breath and wheezing  Cardiovascular: Positive for chest pain  Negative for palpitations  Gastrointestinal: Negative for abdominal distention, abdominal pain, blood in stool, constipation, diarrhea, nausea and vomiting  Genitourinary: Negative for dysuria, frequency, hematuria and urgency  Musculoskeletal: Negative for back pain and neck pain  Skin: Negative for wound  Neurological: Negative for dizziness, seizures, syncope, weakness, numbness and headaches  12-point, complete review of systems was reviewed and negative except as stated above  Historical Information     History reviewed  No pertinent past medical history  History reviewed  No pertinent surgical history  Social History     Tobacco Use    Smoking status: Never Smoker    Smokeless tobacco: Never Used   Substance Use Topics    Alcohol use: Never    Drug use: Never       There is no immunization history on file for this patient  Last Tetanus: n/a  Family History: Non-contributory     Meds/Allergies   all current active meds have been reviewed   No Known Allergies    Objective   Initial Vitals:   Temperature: 99 2 °F (37 3 °C) (03/16/22 2022)  Pulse: 86 (03/16/22 2022)  Respirations: 18 (03/16/22 2022)  Blood Pressure: 132/65 (03/16/22 2022)    Primary Survey:   Airway:        Status: patent;        Pre-hospital Interventions: none        Hospital Interventions: none  Breathing:        Pre-hospital Interventions: none       Effort: normal       Right breath sounds: normal       Left breath sounds: normal  Circulation:        Rhythm: regular       Rate: regular   Right Pulses Left Pulses    R radial: 2+  R femoral: 2+  R pedal: 2+     L radial: 2+  L femoral: 2+  L pedal: 2+       Disability:        GCS: Eye: 4; Verbal: 5 Motor: 6 Total: 15       Right Pupil: round;  reactive         Left Pupil:  round;  reactive      R Motor Strength L Motor Strength    R : 5/5  R dorsiflex: 5/5  R plantarflex: 5/5 L : 5/5  L dorsiflex: 5/5  L plantarflex: 5/5        Sensory:  No sensory deficit  Exposure:       Completed: Yes      Secondary Survey:  Physical Exam  Vitals and nursing note reviewed     Constitutional:       General: He is not in acute distress  Appearance: Normal appearance  He is normal weight  He is not ill-appearing or toxic-appearing  HENT:      Head: Normocephalic  Right Ear: Tympanic membrane normal       Left Ear: Tympanic membrane normal       Nose: Nose normal  No congestion or rhinorrhea  Mouth/Throat:      Mouth: Mucous membranes are moist       Pharynx: Oropharynx is clear  No oropharyngeal exudate  Comments: Odynophagia  Eyes:      Extraocular Movements: Extraocular movements intact  Conjunctiva/sclera:      Right eye: Right conjunctiva is not injected  Left eye: Left conjunctiva is not injected  Pupils: Pupils are equal, round, and reactive to light  Cardiovascular:      Rate and Rhythm: Normal rate and regular rhythm  Heart sounds: No murmur heard  No friction rub  No gallop  Pulmonary:      Effort: Pulmonary effort is normal       Breath sounds: No wheezing, rhonchi or rales  Chest:      Chest wall: Tenderness (anterior) present  Abdominal:      General: Abdomen is flat  There is no distension  Palpations: Abdomen is soft  Tenderness: There is no abdominal tenderness  There is no guarding or rebound  Musculoskeletal:      Right shoulder: Normal       Left shoulder: Normal       Right upper arm: Normal       Left upper arm: Normal       Right elbow: Normal       Left elbow: Normal       Right forearm: Normal       Left forearm: Normal       Right wrist: Normal       Left wrist: Normal       Right hand: Normal       Left hand: Normal       Cervical back: Normal range of motion  No deformity or tenderness  Thoracic back: No deformity or tenderness  Lumbar back: Normal  No swelling, deformity or tenderness        Right hip: Normal       Left hip: Normal       Right upper leg: Normal       Left upper leg: Normal       Right knee: Normal       Left knee: Normal       Right lower leg: Normal       Left lower leg: Normal       Right ankle: Normal       Left ankle: Normal       Right foot: Normal       Left foot: Normal    Skin:     General: Skin is warm and dry  Capillary Refill: Capillary refill takes less than 2 seconds  Neurological:      General: No focal deficit present  Mental Status: He is alert and oriented to person, place, and time  Sensory: No sensory deficit  Motor: No weakness           Invasive Devices  Report    Peripheral Intravenous Line            Peripheral IV 03/16/22 Right Antecubital <1 day              Lab Results:   BMP/CMP:   Lab Results   Component Value Date    SODIUM 139 03/16/2022    K 4 2 03/16/2022     03/16/2022    CO2 27 03/16/2022    BUN 23 03/16/2022    CREATININE 1 00 03/16/2022    CALCIUM 9 5 03/16/2022    AST 37 03/16/2022    ALT 33 03/16/2022    ALKPHOS 72 03/16/2022    EGFR 101 03/16/2022    and CBC:   Lab Results   Component Value Date    WBC 11 19 (H) 03/16/2022    HGB 15 3 03/16/2022    HCT 47 1 03/16/2022    MCV 93 03/16/2022     03/16/2022    MCH 30 1 03/16/2022    MCHC 32 5 03/16/2022    RDW 13 4 03/16/2022    MPV 12 0 03/16/2022    NRBC 0 03/16/2022       Imaging Results: I have personally reviewed pertinent films in PACS  Chest Xray(s): negative for acute findings   FAST exam(s): N/A   CT Scan(s): positive for acute findings: Pneumomediastinum    Additional Xray(s): negative for acute findings     Other Studies: none    Code Status: Level 1 - Full Code

## 2022-03-17 NOTE — DISCHARGE SUMMARY
Discharge Summary - Jael Lang 27 y o  male MRN: [id number]    Unit/Bed#: S -01 Encounter: [id number]    Admission Date:   Admission Orders (From admission, onward)     Ordered        03/17/22 0200  Place in Observation  Once                        Admitting Diagnosis: Pneumomediastinum ([address] ) [J98 2]  Rib injury [S29  9XXA]  Injury of head, initial encounter [S09 90XA]    HPI written by CHARLEY Wyatt 3/17/2022 "Jael Lang is a 27 y o  male with no significant pmh who presents with chest pain after falling while skiing  He reports he landed on his chest and had immediate onset of bilateral chest wall pain  He denies striking his head or loc  He denies extremity pain, abdominal pain, n/v, dizziness  "    Procedures Performed:   Orders Placed This Encounter   Procedures    ED ECG Documentation Only       Summary of Hospital Course:  Patient is a trauma service for observation due to finding of traumatic pneumomediastinum  Patient was maintained NPO, continue to have throat soreness  An esophagram was completed with no evidence of perforation  Patient was then able to tolerate a diet and ambulating independently with in his hospital room  He was discharged home with a friend after long discussion of follow-up with Trauma Clinic with chest x-ray on March 29th  Significant Findings, Care, Treatment and Services Provided:  Please see above and reference hospital medical records  Complications:  None    Discharge Diagnosis:  Stable, traumatic pneumomediastinum    Medical Problems             Resolved Problems  Date Reviewed: 3/17/2022    None                Condition at Discharge: stable         Discharge instructions/Information to patient and family:   See after visit summary for information provided to patient and family  Provisions for Follow-Up Care:  See after visit summary for information related to follow-up care and any pertinent home health orders        PCP: No primary care provider on file  Disposition: Home    Planned Readmission: No      Discharge Statement   I spent 20 minutes discharging the patient  This time was spent on the day of discharge  I had direct contact with the patient on the day of discharge  Additional documentation is required if more than 30 minutes were spent on discharge  Discharge Medications:  See after visit summary for reconciled discharge medications provided to patient and family

## 2022-03-17 NOTE — PLAN OF CARE
Problem: PAIN - ADULT  Goal: Verbalizes/displays adequate comfort level or baseline comfort level  Description: Interventions:  - Encourage patient to monitor pain and request assistance  - Assess pain using appropriate pain scale  - Administer analgesics based on type and severity of pain and evaluate response  - Implement non-pharmacological measures as appropriate and evaluate response  - Consider cultural and social influences on pain and pain management  - Notify physician/advanced practitioner if interventions unsuccessful or patient reports new pain  Outcome: Progressing     Problem: CARDIOVASCULAR - ADULT  Goal: Maintains optimal cardiac output and hemodynamic stability  Description: INTERVENTIONS:  - Monitor I/O, vital signs and rhythm  - Monitor for S/S and trends of decreased cardiac output  - Administer and titrate ordered vasoactive medications to optimize hemodynamic stability  - Assess quality of pulses, skin color and temperature  - Assess for signs of decreased coronary artery perfusion  - Instruct patient to report change in severity of symptoms  Outcome: Progressing     Problem: CARDIOVASCULAR - ADULT  Goal: Absence of cardiac dysrhythmias or at baseline rhythm  Description: INTERVENTIONS:  - Continuous cardiac monitoring, vital signs, obtain 12 lead EKG if ordered  - Administer antiarrhythmic and heart rate control medications as ordered  - Monitor electrolytes and administer replacement therapy as ordered  Outcome: Progressing Consent: The risks of atrophy were reviewed with the patient.  Let us know if it does not calm.

## 2022-03-17 NOTE — ASSESSMENT & PLAN NOTE
- Fall while going off of a jump 8-10 ft while snowboarding landing upside down, mostly on left side of shoulder and head on chest with subsequent chest pain  - patient was wearing a helmet  - Below noted injuries  - Multimodal pain regimen

## 2022-03-17 NOTE — ASSESSMENT & PLAN NOTE
- Traumatic pneumomediastum secondary to fall onto chest  - Associated odynophagia and chest pain when talking  - Admit to med/surg overnight for observation   - Monitor for tachycardia/fevers  - Barium swallow eval in am with odynophagia  - Multimodal pain regimen

## 2022-03-18 LAB
ATRIAL RATE: 87 BPM
P AXIS: 72 DEGREES
PR INTERVAL: 168 MS
QRS AXIS: 25 DEGREES
QRSD INTERVAL: 86 MS
QT INTERVAL: 364 MS
QTC INTERVAL: 438 MS
T WAVE AXIS: 29 DEGREES
VENTRICULAR RATE: 87 BPM

## 2022-03-18 PROCEDURE — 93010 ELECTROCARDIOGRAM REPORT: CPT | Performed by: INTERNAL MEDICINE

## 2024-04-13 ENCOUNTER — OFFICE VISIT (OUTPATIENT)
Dept: URGENT CARE | Age: 29
End: 2024-04-13
Payer: COMMERCIAL

## 2024-04-13 VITALS
HEART RATE: 107 BPM | DIASTOLIC BLOOD PRESSURE: 70 MMHG | SYSTOLIC BLOOD PRESSURE: 100 MMHG | TEMPERATURE: 97.5 F | RESPIRATION RATE: 20 BRPM | OXYGEN SATURATION: 98 %

## 2024-04-13 DIAGNOSIS — J40 BRONCHITIS: ICD-10-CM

## 2024-04-13 DIAGNOSIS — J01.00 ACUTE MAXILLARY SINUSITIS, RECURRENCE NOT SPECIFIED: Primary | ICD-10-CM

## 2024-04-13 PROCEDURE — 99213 OFFICE O/P EST LOW 20 MIN: CPT | Performed by: PHYSICIAN ASSISTANT

## 2024-04-13 RX ORDER — BENZONATATE 100 MG/1
100 CAPSULE ORAL 3 TIMES DAILY PRN
Qty: 20 CAPSULE | Refills: 0 | Status: SHIPPED | OUTPATIENT
Start: 2024-04-13

## 2024-04-13 RX ORDER — BENZONATATE 100 MG/1
100 CAPSULE ORAL 3 TIMES DAILY PRN
Qty: 20 CAPSULE | Refills: 0 | Status: SHIPPED | OUTPATIENT
Start: 2024-04-13 | End: 2024-04-13

## 2024-04-13 RX ORDER — AZITHROMYCIN 250 MG/1
TABLET, FILM COATED ORAL
Qty: 6 TABLET | Refills: 0 | Status: SHIPPED | OUTPATIENT
Start: 2024-04-13 | End: 2024-04-13

## 2024-04-13 RX ORDER — FLUTICASONE PROPIONATE 50 MCG
1 SPRAY, SUSPENSION (ML) NASAL DAILY
Qty: 9.9 ML | Refills: 0 | Status: SHIPPED | OUTPATIENT
Start: 2024-04-13 | End: 2024-04-13

## 2024-04-13 RX ORDER — AZITHROMYCIN 250 MG/1
TABLET, FILM COATED ORAL
Qty: 6 TABLET | Refills: 0 | Status: SHIPPED | OUTPATIENT
Start: 2024-04-13 | End: 2024-04-17

## 2024-04-13 RX ORDER — FLUTICASONE PROPIONATE 50 MCG
1 SPRAY, SUSPENSION (ML) NASAL DAILY
Qty: 9.9 ML | Refills: 0 | Status: SHIPPED | OUTPATIENT
Start: 2024-04-13

## 2024-04-13 NOTE — PROGRESS NOTES
[facility]        NAME: Finesse Camarena is a 28 y.o. male  : 1995    MRN: [id number]  DATE: 2024  TIME: 11:56 AM    /70 (BP Location: Left arm, Patient Position: Sitting)   Pulse (!) 107   Temp 97.5 °F (36.4 °C) (Tympanic)   Resp 20   SpO2 98%     Assessment and Plan   Acute maxillary sinusitis, recurrence not specified [J01.00]  1. Acute maxillary sinusitis, recurrence not specified  fluticasone (FLONASE) 50 mcg/act nasal spray    azithromycin (ZITHROMAX) 250 mg tablet    benzonatate (TESSALON PERLES) 100 mg capsule      2. Bronchitis  fluticasone (FLONASE) 50 mcg/act nasal spray    azithromycin (ZITHROMAX) 250 mg tablet    benzonatate (TESSALON PERLES) 100 mg capsule            Patient Instructions       Follow up with PCP in 3-5 days.  Proceed to  ER if symptoms worsen.    Chief Complaint     Chief Complaint   Patient presents with    Cold Like Symptoms     Pt presents with cold-like symptoms that started 1 week ago. Pt c/o sinus pressure, runny nose, cough, sore throat, and fatigue.          History of Present Illness       Pt with 1 week productive cough and nasal congestion         Review of Systems   Review of Systems   Constitutional: Negative.    HENT: Negative.     Eyes: Negative.    Respiratory: Negative.     Cardiovascular: Negative.    Gastrointestinal: Negative.    Endocrine: Negative.    Genitourinary: Negative.    Musculoskeletal: Negative.    Skin: Negative.    Allergic/Immunologic: Negative.    Neurological: Negative.    Hematological: Negative.    Psychiatric/Behavioral: Negative.     All other systems reviewed and are negative.        Current Medications       Current Outpatient Medications:     acetaminophen (TYLENOL) 325 mg tablet, Take 2 tablets (650 mg total) by mouth every 8 (eight) hours, Disp: , Rfl: 0    azithromycin (ZITHROMAX) 250 mg tablet, Take 2 tablets today then 1 tablet daily x 4 days, Disp: 6 tablet, Rfl: 0    benzonatate (TESSALON PERLES)  100 mg capsule, Take 1 capsule (100 mg total) by mouth 3 (three) times a day as needed for cough, Disp: 20 capsule, Rfl: 0    fluticasone (FLONASE) 50 mcg/act nasal spray, 1 spray into each nostril daily, Disp: 9.9 mL, Rfl: 0    ibuprofen (MOTRIN) 400 mg tablet, Take 1 tablet (400 mg total) by mouth every 6 (six) hours as needed for mild pain, Disp: , Rfl: 0    Current Allergies     Allergies as of 04/13/2024    (No Known Allergies)            The following portions of the patient's history were reviewed and updated as appropriate: allergies, current medications, past family history, past medical history, past social history, past surgical history and problem list.     History reviewed. No pertinent past medical history.    History reviewed. No pertinent surgical history.    No family history on file.      Medications have been verified.        Objective   /70 (BP Location: Left arm, Patient Position: Sitting)   Pulse (!) 107   Temp 97.5 °F (36.4 °C) (Tympanic)   Resp 20   SpO2 98%        Physical Exam     Physical Exam  Vitals and nursing note reviewed.   Constitutional:       Appearance: Normal appearance. He is normal weight.   HENT:      Head: Normocephalic and atraumatic.      Right Ear: Tympanic membrane, ear canal and external ear normal.      Left Ear: Tympanic membrane, ear canal and external ear normal.      Nose: Congestion and rhinorrhea present.      Comments: Boggy mucosa yellow nasal d/c      Mouth/Throat:      Mouth: Mucous membranes are moist.   Eyes:      Extraocular Movements: Extraocular movements intact.      Conjunctiva/sclera: Conjunctivae normal.      Pupils: Pupils are equal, round, and reactive to light.   Cardiovascular:      Rate and Rhythm: Normal rate and regular rhythm.      Pulses: Normal pulses.      Heart sounds: Normal heart sounds.   Pulmonary:      Effort: Pulmonary effort is normal.      Comments: Minor coarse sounds cleared with cough  Abdominal:      General: Abdomen  is flat.      Palpations: Abdomen is soft.   Musculoskeletal:         General: Normal range of motion.      Cervical back: Normal range of motion and neck supple.   Skin:     General: Skin is warm.      Capillary Refill: Capillary refill takes less than 2 seconds.   Neurological:      Mental Status: He is alert and oriented to person, place, and time.